# Patient Record
Sex: FEMALE | Race: WHITE | NOT HISPANIC OR LATINO | Employment: FULL TIME | URBAN - METROPOLITAN AREA
[De-identification: names, ages, dates, MRNs, and addresses within clinical notes are randomized per-mention and may not be internally consistent; named-entity substitution may affect disease eponyms.]

---

## 2018-01-08 ENCOUNTER — NURSE TRIAGE (OUTPATIENT)
Dept: ADMINISTRATIVE | Facility: CLINIC | Age: 63
End: 2018-01-08

## 2018-01-09 NOTE — TELEPHONE ENCOUNTER
"    Reason for Disposition   [1] BP  < 140 /90 AND [2] taking BP medications (all triage questions negative)    Protocols used: ST HIGH BLOOD PRESSURE-A-AH    Cary was last seen in neurology by Julee Lacy 03/28/16 following treatment for SAH 02/23/16.  She states she was put on atenolol 5 mg daily since then for HTN.  Reports her BP today is now 120/80, pulse 65, but was 137/89, pulse 75 one hour ago.  She states she is concerned because she recently saw "the halo" in her left eye, but it was transient and only lasted 10-15 min at 1840 tonight; it was after she had exercised "quite a lot."   No headache, no weakness, numbness, or other changes, just the halo, now gone.  She has not been seen in neuro since 03/28/16, and is requesting a call from them to make appt as soon as possible for evaluation.  Message to JOHN Garcia, to help her get appointment with physician as soon as possible. Requested that Cary call tomorrow morning to neuro clinic, as well. Home care advice and reasons to call back gone over with her.  Please contact caller directly with any additional care advice.    "

## 2018-02-28 ENCOUNTER — OFFICE VISIT (OUTPATIENT)
Dept: NEUROLOGY | Facility: CLINIC | Age: 63
End: 2018-02-28
Payer: COMMERCIAL

## 2018-02-28 VITALS
HEART RATE: 63 BPM | WEIGHT: 142 LBS | DIASTOLIC BLOOD PRESSURE: 80 MMHG | SYSTOLIC BLOOD PRESSURE: 114 MMHG | BODY MASS INDEX: 21.52 KG/M2 | HEIGHT: 68 IN

## 2018-02-28 DIAGNOSIS — G43.109 OCULAR MIGRAINE: Primary | ICD-10-CM

## 2018-02-28 DIAGNOSIS — I10 ESSENTIAL HYPERTENSION: ICD-10-CM

## 2018-02-28 DIAGNOSIS — Z86.79 HISTORY OF SUBARACHNOID HEMORRHAGE: ICD-10-CM

## 2018-02-28 PROCEDURE — 99999 PR PBB SHADOW E&M-EST. PATIENT-LVL IV: CPT | Mod: PBBFAC,,, | Performed by: PSYCHIATRY & NEUROLOGY

## 2018-02-28 PROCEDURE — 3074F SYST BP LT 130 MM HG: CPT | Mod: S$GLB,,, | Performed by: PSYCHIATRY & NEUROLOGY

## 2018-02-28 PROCEDURE — 99214 OFFICE O/P EST MOD 30 MIN: CPT | Mod: S$GLB,,, | Performed by: PSYCHIATRY & NEUROLOGY

## 2018-02-28 PROCEDURE — 3079F DIAST BP 80-89 MM HG: CPT | Mod: S$GLB,,, | Performed by: PSYCHIATRY & NEUROLOGY

## 2018-02-28 RX ORDER — ATENOLOL 25 MG/1
25 TABLET ORAL
Refills: 10 | COMMUNITY
Start: 2018-02-24

## 2018-03-04 NOTE — PROGRESS NOTES
"Patient Name: Cary Peters  MRN: [id number]    CC: visual symptoms    HPI: Cary Peters is a 62 y.o. female with PMHx of HTN who presents to neurology clinic for evaluation of visual symptoms.   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 Reported seeing a "halo with squiggly lines" around it. Picture of ocular scintillations was shown to the patient and she identified with seeing something similar. Symptoms dissipated within a half hour and then had onset of frontal headache. Denied nausea/vomiting/photo or phonophobia with the headache. Went to get this worked up at the ED. Was admitted to the hospital when workup with CT was remarkable for a subarachnoid hemorrhage involving the right posterior cerebral convexity. She was admitted to the [facility]. MRA, angiogram did not show evidence of vascular malformation. She was reported to have been HTN at the time however not diagnosed with HTN at that time. Had resolution of headache during hospital admission and discharged home stable.   Had follow up that year with stroke and neurosurgery with repeat CT which was showed resolution of SAH.   Apr 2015 - was started on atenolol by PCP for HTN.   Was symptomatic until OCt 2017 when she had another similar visual disturbance that spontaneously resolved in 30 minutes but was not associated with a headache this time. She reported seeing her eye doctor who said there were no concerns from the retinal aspect and was most likely a migraine. CT head was also reported to have been done at the time which was unremarkable.   Then had another episode in Jan 2018 - also not associated with a headache and subsided within 30 minutes. Decided to have this worked up by neurology.   No prior history of headaches other than the one in Feb 2016. No other associated neurologic symptoms with these episodes.   No other vascular risk factors other than HTN.     Reports " side effects to atenolol specifically decreased libido and would like to be off medication or consider alternative.    Social history: never smoker. Drinks a glass of wine daily.       ROS:   Review of Systems   Constitutional: Negative for malaise/fatigue. Negative for weight loss.   HENT: Negative for hearing loss.   Eyes: Negative for blurred vision and double vision.   Respiratory: Negative for shortness of breath and stridor.   Cardiovascular: Negative for chest pain and palpitations.   Gastrointestinal: Negative for nausea, vomiting and constipation.   Genitourinary: Negative for frequency. Negative for urgency.   Musculoskeletal: Negative for joint pain. Negative for myalgias and falls.   Skin: Negative for rash.   Neurological: Negative for dizziness and tremors. Negative for focal weakness and seizures.   Endo/Heme/Allergies: Does not bruise/bleed easily.   Psychiatric/Behavioral: Negative for memory loss. Negative for depression and hallucinations. The patient is not nervous/anxious.      Past Medical History  Past Medical History:   Diagnosis Date    Hypercholesteremia     Ocular migraine        Medications    Current Outpatient Prescriptions:     atenolol (TENORMIN) 25 MG tablet, 25 mg., Disp: , Rfl: 10    sertraline (ZOLOFT) 50 MG tablet, Take 50 mg by mouth once daily., Disp: , Rfl:     Allergies  Review of patient's allergies indicates:  No Known Allergies    Social History  Social History     Social History    Marital status:      Spouse name: N/A    Number of children: N/A    Years of education: N/A     Occupational History    Not on file.     Social History Main Topics    Smoking status: Never Smoker    Smokeless tobacco: Not on file    Alcohol use Not on file    Drug use: Unknown    Sexual activity: Not on file     Other Topics Concern    Not on file     Social History Narrative    No narrative on file       Family History  No family history on file.    Physical Exam  BP  "114/80   Pulse 63   Ht 5' 8" (1.727 m)   Wt 64.4 kg (141 lb 15.6 oz)   LMP  (LMP Unknown)   BMI 21.59 kg/m²     General appearance: Well-developed, well-groomed.     Neurologic Exam: The patient is awake, alert and oriented. Language is fluent. Recent and remote memory are normal. Attention span and concentration are normal. Fund of knowledge is appropriate.     Cranial nerves: pupils are round and reactive to light and accommodation. Visual fields are full to confrontation. Ocular motility is full in all cardinal positions of gaze. Facial sensation is normal to pinprick and light touch. Facial activation is symmetric. Hearing is normal bilaterally. Palate elevates symmetrically and gag reflex is intact bilaterally. Shoulder elevation is symmetric and full strength bilaterally. Tongue is midline and neck rotation strength is normal bilaterally. Neck range of motion is normal.     Motor examination of all extremities demonstrates normal bulk and tone in all four limbs. There are no atrophy or fasciculations. Strength is 5/5 in the upper and lower extremities bilaterally without pronator drift.     Sensory examination is normal to pinprick, vibration and proprioception in the upper and lower extremities bilaterally. Romberg is negative.    Deep tendon reflexes are 2+ and symmetric in the upper and lower extremities bilaterally. Toes are mute bilaterally.     Gait: Normal heel, toe, tandem, and casual gait.    Coordination: Finger to nose and heel to shin testing is normal in both upper and lower extremities. Rapid alternating movements are normal in both upper and lower extremities.     General exam  Cardiovascular: regular rate and rhythm with no murmurs, rubs or gallops. There are no carotid or vertebral artery bruits. Pulses in both upper and lower extremities are symmetric. There is no peripheral edema.   Head and neck: no cervical lymphadenopathy      Lab and Test Results    WBC   Date Value Ref Range Status "   02/26/2016 5.24 3.90 - 12.70 K/uL Final   02/25/2016 5.06 3.90 - 12.70 K/uL Final   02/24/2016 6.12 3.90 - 12.70 K/uL Final     Hemoglobin   Date Value Ref Range Status   02/26/2016 12.8 12.0 - 16.0 g/dL Final   02/25/2016 13.0 12.0 - 16.0 g/dL Final   02/24/2016 13.2 12.0 - 16.0 g/dL Final     Hematocrit   Date Value Ref Range Status   02/26/2016 38.1 37.0 - 48.5 % Final   02/25/2016 38.6 37.0 - 48.5 % Final   02/24/2016 39.5 37.0 - 48.5 % Final     Platelets   Date Value Ref Range Status   02/26/2016 236 150 - 350 K/uL Final   02/25/2016 237 150 - 350 K/uL Final   02/24/2016 234 150 - 350 K/uL Final     Glucose   Date Value Ref Range Status   02/26/2016 80 70 - 110 mg/dL Final   02/25/2016 90 70 - 110 mg/dL Final   02/24/2016 94 70 - 110 mg/dL Final     Sodium   Date Value Ref Range Status   02/26/2016 141 136 - 145 mmol/L Final   02/25/2016 139 136 - 145 mmol/L Final   02/24/2016 142 136 - 145 mmol/L Final     Potassium   Date Value Ref Range Status   02/26/2016 3.8 3.5 - 5.1 mmol/L Final   02/25/2016 4.2 3.5 - 5.1 mmol/L Final     Comment:     *Slightly Hemolyzed   02/25/2016 3.8 3.5 - 5.1 mmol/L Final     Chloride   Date Value Ref Range Status   02/26/2016 107 95 - 110 mmol/L Final   02/25/2016 108 95 - 110 mmol/L Final   02/24/2016 109 95 - 110 mmol/L Final     CO2   Date Value Ref Range Status   02/26/2016 24 23 - 29 mmol/L Final   02/25/2016 24 23 - 29 mmol/L Final   02/24/2016 25 23 - 29 mmol/L Final     BUN, Bld   Date Value Ref Range Status   02/26/2016 9 8 - 23 mg/dL Final   02/25/2016 10 8 - 23 mg/dL Final   02/24/2016 6 (L) 8 - 23 mg/dL Final     Creatinine   Date Value Ref Range Status   02/26/2016 0.7 0.5 - 1.4 mg/dL Final   02/25/2016 0.7 0.5 - 1.4 mg/dL Final   02/24/2016 0.7 0.5 - 1.4 mg/dL Final     Calcium   Date Value Ref Range Status   02/26/2016 9.0 8.7 - 10.5 mg/dL Final   02/25/2016 8.9 8.7 - 10.5 mg/dL Final   02/24/2016 9.1 8.7 - 10.5 mg/dL Final     Magnesium   Date Value Ref Range  Status   02/26/2016 2.0 1.6 - 2.6 mg/dL Final   02/25/2016 2.2 1.6 - 2.6 mg/dL Final   02/25/2016 2.1 1.6 - 2.6 mg/dL Final     Phosphorus   Date Value Ref Range Status   02/26/2016 4.1 2.7 - 4.5 mg/dL Final   02/25/2016 3.6 2.7 - 4.5 mg/dL Final   02/25/2016 4.2 2.7 - 4.5 mg/dL Final     Alkaline Phosphatase   Date Value Ref Range Status   02/26/2016 50 (L) 55 - 135 U/L Final   02/25/2016 51 (L) 55 - 135 U/L Final   02/24/2016 51 (L) 55 - 135 U/L Final     ALT   Date Value Ref Range Status   02/26/2016 19 10 - 44 U/L Final   02/25/2016 23 10 - 44 U/L Final   02/24/2016 25 10 - 44 U/L Final     AST   Date Value Ref Range Status   02/26/2016 27 10 - 40 U/L Final   02/25/2016 27 10 - 40 U/L Final   02/24/2016 28 10 - 40 U/L Final         Images:     MRI/MRA brain 2/23/16  Subarachnoid hemorrhage involving the right posterior cerebral convexity.  No identifiable etiologies are seen  Unremarkable MRA head and neck specifically without focal stenosis, aneurysm or vascular malformation.    IR angiogram 2/25/16  1. Normal cerebral angiogram.  There is nothing seen to explain the patient's subarachnoid hemorrhage.     Assessment and Plan    Cary Peters is a 62 y.o. female with PMHx of HTN presenting with symptoms of transient ocular scintillations. In the past, symptoms associated with headache that showed presence of SAH that has been worked up without evidence of structural pathology and most likely related to HTN. However has had repeat ocular scintillations without headaches and workup with CT unrevealing. Likely that these are ocular migraines now however would like to rule out underlying pathology with MRI brain. No MRI done since the SAH. Currently on atenolol for HTN which is likely helping decrease frequency of symptoms however given reported side effects on medication, patient to follow up with PCP for alternative BP medication. Will trial magnesium.     Plan:   - MRI brain W Wo contrast  - Recommend  Magnesium 200mg daily       Leonela Anne  Neurology Resident - PGY 3  Department of Neurology  [facility] [place] [address]

## 2018-03-06 NOTE — PROGRESS NOTES
I have reviewed the notes, assessments, and plan, I concur with her/his documentation of Cary Peters. Patient was seen and examined with the resident.    Jahaira Cee MD  General Neurology Staff  [facility]

## 2019-08-19 ENCOUNTER — NURSE TRIAGE (OUTPATIENT)
Dept: ADMINISTRATIVE | Facility: CLINIC | Age: 64
End: 2019-08-19

## 2019-08-19 NOTE — TELEPHONE ENCOUNTER
" Took 2 ibuprofen after halo and B/P was 122/73 and then 117/73 P.63.    Reason for Disposition   [1] Brief (now gone) blurred vision AND [2] unexplained    Additional Information   Negative: Followed getting substance in the eye   Negative: Foreign body or object is or was lodged in the eye   Negative: Followed an eye injury   Negative: Followed sun lamp or sun exposure (UV keratitis)   Negative: Yellow or green discharge (pus) in the eye   Negative: Pregnant   Negative: Postpartum   Negative: Complete loss of vision in 1 or both eyes   Negative: Severe eye pain   Negative: Severe headache   Negative: Double vision   Negative: [1] Blurred vision or visual changes AND [2] present now AND [3] sudden onset or new (e.g., minutes, hours, days)  (Exception: previously diagnosed migraine headaches with same symptoms)   Negative: Patient sounds very sick or weak to the triager   Negative: Flashes of light  (Exception: brief from pressing on the eyeball)   Negative: [1] Eye pain AND [2] brief (now gone) blurred vision or visual changes   Negative: [1] Taking digoxin (e.g., Lanoxin, Digitek, Cardoxin, Lanoxicaps) AND [2] blurred vision, yellow vision, or yellow-green halos   Negative: Many floaters in the eye   Negative: [1] Jaw pain while eating AND [2] age > 50   Negative: [1] Headache AND [2] age > 50   Negative: Single floater (i.e., small speck seems to float across the eye)    Answer Assessment - Initial Assessment Questions  1. DESCRIPTION: "What is the vision loss like? Describe it for me." (e.g., complete vision loss, blurred vision, double vision, floaters, etc.)      no  2. LOCATION: "One or both eyes?" If one, ask: "Which eye?"      Right eye  3. SEVERITY: "Can you see anything?" If so, ask: "What can you see?" (e.g., fine print)      Can see everything now. Had short period of time where she saw halo's  4. ONSET: "When did this begin?" "Did it start suddenly or has this been gradual?"      2 " "hours ago  5. PATTERN: "Does this come and go, or has it been constant since it started?"      Once it left it is gone  6. PAIN: "Is there any pain in your eye(s)?"  (Scale 1-10; or mild, moderate, severe)      no  7. CONTACTS-GLASSES: "Do you wear contacts or glasses?"      glasses  8. CAUSE: "What do you think is causing this visual problem?"      unsure  9. OTHER SYMPTOMS: "Do you have any other symptoms?" (e.g., headache, arm or leg weakness)      no  10. PREGNANCY: "Is there any chance you are pregnant?" "When was your last menstrual period?"        n/a    Protocols used: ST VISION LOSS OR CHANGE-A-AH      "